# Patient Record
Sex: FEMALE | Race: WHITE | Employment: FULL TIME | URBAN - METROPOLITAN AREA
[De-identification: names, ages, dates, MRNs, and addresses within clinical notes are randomized per-mention and may not be internally consistent; named-entity substitution may affect disease eponyms.]

---

## 2018-01-01 PROBLEM — F32.A DEPRESSIVE DISORDER: Status: ACTIVE | Noted: 2018-01-01

## 2018-01-01 NOTE — ED NOTES
Contacted [facility] and Spoke with Ian Galindo @ this time. He gave an ETA of 45 minutes.  Will be canceling EAS @ this time due to finding a sooner ETA with [facility]

## 2018-01-01 NOTE — ED NOTES
Patient reports feeling anxious and sweaty following reglan administration, MD notified and order received for benadryl.

## 2018-01-01 NOTE — ED NOTES
OTILIA assessment remains in progress. Patient remains calm and cooperative, participating in evaluation. Patient's mother remains at bedside.

## 2018-01-01 NOTE — ED PROVIDER NOTES
Patient Seen in: [facility] Emergency Department    History   Patient presents with:  Eval-P (psychiatric)  Anxiety/Panic attack (neurologic)    Stated Complaint: not eating/ anxious    HPI    82-year-old female who is 5 weeks pregnant this particul cm (5' 3\")   Wt 46.3 kg   LMP 11/19/2017   SpO2 99%   BMI 18.07 kg/m²         Physical Exam    HEENT : NCAT, EOMI, PEERL, throat clear, neck supple, no JVD, trachea midline, No LAD  Heart: S1S2 normal. No murmurs, regular rate and rhythm  Lungs: Clear to (0 mL Intravenous Stopped 1/1/18 1627)   ondansetron HCl (ZOFRAN) injection 4 mg (4 mg Intravenous Given 1/1/18 1431)   I have ordered potassium replacement for the patient.   The patient was evaluated crisis worker from [facility] patient was ruperto

## 2018-01-01 NOTE — BH LEVEL OF CARE ASSESSMENT
Level of Care Assessment Note    General Questions  Why are you here?: \"It got to a point where I wasn't able to eat.  I thought if I got fluids I would be able to re-set\"   Precipitating Events: Pt stated that she feels she has been in \"non-stop panic m RN note in triage, pt requested that \"cyanide be put into an IV\" for her. Past Suicidal Ideation: Yes  Date of Past Suicidal Ideation:  (Pt reports SI as a teen.  )  Describe Past Suicidal Ideation: Pt reported hx of plan as a teenager.   She stated th When asked about level of depression she stated, \"I don't  know it is a pretty subjective thing. \" When asked about isolation she became tearful and stated, \"yes, but I am trying not to be. \"  Pt stated that she went from sleeping 16 hours per day to not Anxious;Depressed; Helpless; Hopeless; Worthless, low self esteem  Affect: Congruent  Eye Contact: Poor  Level of Consciousness: Alert  Exhibited Behavior : Appropriate to situation; Cooperative; Fidgety;Participated; Restless; Tearful  Gait/Movement: Steady  Spe Level of Care Recommendations  Consulted with: Dr. Hillary Gambino   Level of Care Recommendation: Inpatient Acute Care  Unit: Adult  Reason for Unit Assigned: age, sx   Inpatient Criteria: Suicidal/homicidal risk  Behavioral Precautions: Suicide  Medical Precau

## 2018-01-01 NOTE — ED NOTES
Informed of plan for admission to [facility] at this time for inpatient treatment. Will be admitted under Dr. Ector Regalado for MDD. Mother remains sitting at bedside.

## 2018-01-01 NOTE — BH LEVEL OF CARE ASSESSMENT
Level of Care Assessment Note    General Questions  Why are you here?: \"It got to a point where I wasn't able to eat.  I thought if I got fluids I would be able to re-set\"   Precipitating Events: Pt stated that she feels she has been in \"non-stop panic m RN note in triage, pt requested that \"cyanide be put into an IV\" for her. Past Suicidal Ideation: Yes  Date of Past Suicidal Ideation:  (Pt reports SI as a teen.  )  Describe Past Suicidal Ideation: Pt reported hx of plan as a teenager.   She stated th When asked about level of depression she stated, \"I don't  know it is a pretty subjective thing. \" When asked about isolation she became tearful and stated, \"yes, but I am trying not to be. \"  Pt stated that she went from sleeping 16 hours per day to not Anxious;Depressed; Helpless; Hopeless; Worthless, low self esteem  Affect: Congruent  Eye Contact: Poor  Level of Consciousness: Alert  Exhibited Behavior : Appropriate to situation; Cooperative; Fidgety;Participated; Restless; Tearful  Gait/Movement: Steady  Spe Suicidal/homicidal risk  Behavioral Precautions: Suicide  Medical Precautions: None  Refused Treatment: No  Transferred: No    Primary Psychiatric Diagnosis  Depressive Disorders: Major Depressive Disorder, Recurrent Episode  Depressive Disorder Recurrent

## 2018-01-01 NOTE — ED NOTES
Spoke with Carina from [address] [place] [address] @ this time and she gave an ETA of 60-90 minutes.  Calling other ambulance services to look for sooner ETA

## 2018-01-02 PROBLEM — N80.9 ENDOMETRIOSIS: Status: ACTIVE | Noted: 2018-01-02

## 2018-01-02 PROBLEM — R51.9 FREQUENT HEADACHES: Status: ACTIVE | Noted: 2018-01-02

## 2018-01-02 PROBLEM — K58.9 IBS (IRRITABLE BOWEL SYNDROME): Chronic | Status: ACTIVE | Noted: 2018-01-02

## 2018-01-02 PROBLEM — Z34.90 PREGNANT: Status: ACTIVE | Noted: 2018-01-02

## 2018-01-02 PROBLEM — K58.9 IBS (IRRITABLE BOWEL SYNDROME): Status: ACTIVE | Noted: 2018-01-02

## 2018-01-02 PROBLEM — K59.00 CONSTIPATION: Status: ACTIVE | Noted: 2018-01-02

## 2018-01-02 NOTE — ED NOTES
Patient feeling better, nausea decreased and drinking water at this time. [facility] here to transport patient. Calm and cooperative, respirations WNL. No distress observed.  1 bag of belongings sent home with mother per patient's requests and 2 bags s

## 2018-01-02 NOTE — ED NOTES
Patient resting on cart, boyfriend and mother at bedside. Waiting for transport at this time. Remains in patient gown. EMTALA form prepared for transport.

## 2018-01-07 ENCOUNTER — APPOINTMENT (OUTPATIENT)
Dept: ULTRASOUND IMAGING | Facility: HOSPITAL | Age: 33
End: 2018-01-07
Attending: EMERGENCY MEDICINE
Payer: COMMERCIAL

## 2018-01-07 ENCOUNTER — HOSPITAL ENCOUNTER (EMERGENCY)
Facility: HOSPITAL | Age: 33
Discharge: HOME | End: 2018-01-07
Attending: EMERGENCY MEDICINE
Payer: COMMERCIAL

## 2018-01-07 VITALS
RESPIRATION RATE: 16 BRPM | SYSTOLIC BLOOD PRESSURE: 116 MMHG | BODY MASS INDEX: 17.75 KG/M2 | TEMPERATURE: 99 F | OXYGEN SATURATION: 100 % | HEART RATE: 100 BPM | DIASTOLIC BLOOD PRESSURE: 86 MMHG | WEIGHT: 100.19 LBS | HEIGHT: 63 IN

## 2018-01-07 DIAGNOSIS — N83.11 CORPUS LUTEUM CYST OF RIGHT OVARY: Primary | ICD-10-CM

## 2018-01-07 DIAGNOSIS — Z34.90 INTRAUTERINE PREGNANCY: ICD-10-CM

## 2018-01-07 LAB
BASOPHILS # BLD AUTO: 0.04 X10(3) UL (ref 0–0.1)
BASOPHILS NFR BLD AUTO: 0.4 %
BILIRUB UR QL STRIP.AUTO: NEGATIVE
BUN BLD-MCNC: 7 MG/DL (ref 8–20)
CALCIUM BLD-MCNC: 8.5 MG/DL (ref 8.3–10.3)
CHLORIDE: 106 MMOL/L (ref 101–111)
CO2: 19 MMOL/L (ref 22–32)
COLOR UR AUTO: YELLOW
CREAT BLD-MCNC: 0.74 MG/DL (ref 0.55–1.02)
EOSINOPHIL # BLD AUTO: 0.1 X10(3) UL (ref 0–0.3)
EOSINOPHIL NFR BLD AUTO: 1.1 %
ERYTHROCYTE [DISTWIDTH] IN BLOOD BY AUTOMATED COUNT: 13.3 % (ref 11.5–16)
GLUCOSE BLD-MCNC: 110 MG/DL (ref 70–99)
GLUCOSE UR STRIP.AUTO-MCNC: NEGATIVE MG/DL
HCG QUANTITATIVE: ABNORMAL MIU/ML (ref ?–3)
HCT VFR BLD AUTO: 40.3 % (ref 34–50)
HGB BLD-MCNC: 13.9 G/DL (ref 12–16)
IMMATURE GRANULOCYTE COUNT: 0.01 X10(3) UL (ref 0–1)
IMMATURE GRANULOCYTE RATIO %: 0.1 %
KETONES UR STRIP.AUTO-MCNC: NEGATIVE MG/DL
LYMPHOCYTES # BLD AUTO: 1.79 X10(3) UL (ref 0.9–4)
LYMPHOCYTES NFR BLD AUTO: 19.4 %
MCH RBC QN AUTO: 28.4 PG (ref 27–33.2)
MCHC RBC AUTO-ENTMCNC: 34.5 G/DL (ref 31–37)
MCV RBC AUTO: 82.4 FL (ref 81–100)
MONOCYTES # BLD AUTO: 1.13 X10(3) UL (ref 0.1–0.6)
MONOCYTES NFR BLD AUTO: 12.3 %
NEUTROPHIL ABS PRELIM: 6.14 X10 (3) UL (ref 1.3–6.7)
NEUTROPHILS # BLD AUTO: 6.14 X10(3) UL (ref 1.3–6.7)
NEUTROPHILS NFR BLD AUTO: 66.7 %
NITRITE UR QL STRIP.AUTO: NEGATIVE
PH UR STRIP.AUTO: 6 [PH] (ref 4.5–8)
PLATELET # BLD AUTO: 310 10(3)UL (ref 150–450)
POTASSIUM SERPL-SCNC: 3.7 MMOL/L (ref 3.6–5.1)
PROT UR STRIP.AUTO-MCNC: NEGATIVE MG/DL
RBC # BLD AUTO: 4.89 X10(6)UL (ref 3.8–5.1)
RED CELL DISTRIBUTION WIDTH-SD: 39.9 FL (ref 35.1–46.3)
RH BLOOD TYPE: POSITIVE
SODIUM SERPL-SCNC: 135 MMOL/L (ref 136–144)
SP GR UR STRIP.AUTO: 1.01 (ref 1–1.03)
UROBILINOGEN UR STRIP.AUTO-MCNC: <2 MG/DL
WBC # BLD AUTO: 9.2 X10(3) UL (ref 4–13)

## 2018-01-07 PROCEDURE — 87086 URINE CULTURE/COLONY COUNT: CPT | Performed by: EMERGENCY MEDICINE

## 2018-01-07 PROCEDURE — 84702 CHORIONIC GONADOTROPIN TEST: CPT | Performed by: EMERGENCY MEDICINE

## 2018-01-07 PROCEDURE — 76801 OB US < 14 WKS SINGLE FETUS: CPT | Performed by: EMERGENCY MEDICINE

## 2018-01-07 PROCEDURE — 76775 US EXAM ABDO BACK WALL LIM: CPT | Performed by: EMERGENCY MEDICINE

## 2018-01-07 PROCEDURE — 99284 EMERGENCY DEPT VISIT MOD MDM: CPT

## 2018-01-07 PROCEDURE — 86901 BLOOD TYPING SEROLOGIC RH(D): CPT | Performed by: EMERGENCY MEDICINE

## 2018-01-07 PROCEDURE — 76817 TRANSVAGINAL US OBSTETRIC: CPT | Performed by: EMERGENCY MEDICINE

## 2018-01-07 PROCEDURE — 96360 HYDRATION IV INFUSION INIT: CPT

## 2018-01-07 PROCEDURE — 96361 HYDRATE IV INFUSION ADD-ON: CPT

## 2018-01-07 PROCEDURE — 81001 URINALYSIS AUTO W/SCOPE: CPT | Performed by: EMERGENCY MEDICINE

## 2018-01-07 PROCEDURE — 85025 COMPLETE CBC W/AUTO DIFF WBC: CPT | Performed by: EMERGENCY MEDICINE

## 2018-01-07 PROCEDURE — 80048 BASIC METABOLIC PNL TOTAL CA: CPT | Performed by: EMERGENCY MEDICINE

## 2018-01-07 PROCEDURE — 86900 BLOOD TYPING SEROLOGIC ABO: CPT | Performed by: EMERGENCY MEDICINE

## 2018-01-08 NOTE — ED INITIAL ASSESSMENT (HPI)
7 weeks pregnant, presents with RLQ, right lower pelvic pain radiating to back. +N/V. Denies dysuria or urinary symptoms.

## 2018-01-08 NOTE — ED PROVIDER NOTES
Patient Seen in: [facility] Emergency Department    History   Patient presents with:  Pregnancy Issues (gynecologic)    Stated Complaint: abd pain, 7 weeks pregnant, no bleeding    HPI    Patient complains of right pelvic pain.   Patient states that HPI.  Constitutional and vital signs reviewed. All other systems reviewed and negative except as noted above.     Physical Exam   ED Triage Vitals [01/07/18 1931]  BP: 131/88  Pulse: 110  Resp: 16  Temp: 98.5 °F (36.9 °C)  Temp src: Temporal  SpO2: 98 TEST) - Abnormal; Notable for the following:     Hcg Quantitative 74,207.0 (*)     All other components within normal limits   CBC W/ DIFFERENTIAL - Abnormal; Notable for the following:     Monocyte Absolute 1.13 (*)     All other components within normal safely discharged home.   Recommend rest, fluids, Tylenol, pelvic rest, and close follow-up with her gynecologist.  Patient and her mother in agreement with the plan      Disposition and Plan     Clinical Impression:  Corpus luteum cyst of right ovary  (ester

## 2020-03-26 NOTE — ED INITIAL ASSESSMENT (HPI)
Left a detailed message for Marquita regarding pt.    Pt presents to the ED with complaints of anxiety over the past week. Pt states she was seen at [facility] on Saturday and will be entering an outpatient program for depression/anxiety. Pt c/o worsening nausea and inability to eat or drink.  Pt states she has been

## (undated) NOTE — ED AVS SNAPSHOT
[facility]   MRN: [id number]    Department:  [facility] Emergency Department   Date of Visit:  1/7/2018           Disclosure     Insurance plans vary and the physician(s) referred by the ER may not be covered by your plan.  Please contact you tell this physician (or your personal doctor if your instructions are to return to your personal doctor) about any new or lasting problems. The primary care or specialist physician will see patients referred from the [facility] Emergency Department.  Salvadore Skiff